# Patient Record
Sex: FEMALE | URBAN - METROPOLITAN AREA
[De-identification: names, ages, dates, MRNs, and addresses within clinical notes are randomized per-mention and may not be internally consistent; named-entity substitution may affect disease eponyms.]

---

## 2019-10-18 VITALS
RESPIRATION RATE: 18 BRPM | HEART RATE: 105 BPM | DIASTOLIC BLOOD PRESSURE: 64 MMHG | OXYGEN SATURATION: 99 % | TEMPERATURE: 98 F | SYSTOLIC BLOOD PRESSURE: 143 MMHG

## 2019-10-18 RX ORDER — CHLORHEXIDINE GLUCONATE 213 G/1000ML
1 SOLUTION TOPICAL ONCE
Refills: 0 | Status: DISCONTINUED | OUTPATIENT
Start: 2019-10-24 | End: 2019-10-24

## 2019-10-18 NOTE — H&P ADULT - NSHPSOCIALHISTORY_GEN_ALL_CORE
Current smoker: 7-8 cig/day: since age 13  Denies ETOH or drug use  Has HHA   In addition, utilizes cane/walker and at times wheelchair at home.

## 2019-10-18 NOTE — H&P ADULT - MENTAL STATUS
Right sided hemiparesis: 5/5 LLE and LUE strength  Right side: RUE contracted; 3/5 strength upper extremity and 2/5 lower extremity.

## 2019-10-18 NOTE — H&P ADULT - NSICDXPASTMEDICALHX_GEN_ALL_CORE_FT
PAST MEDICAL HISTORY:  CAD (coronary artery disease)     DM (diabetes mellitus)     HLD (hyperlipidemia)     HTN (hypertension) PAST MEDICAL HISTORY:  CAD (coronary artery disease)     CVA (cerebrovascular accident) right sided hemiparesis    DM (diabetes mellitus)     HLD (hyperlipidemia)     HTN (hypertension)

## 2019-10-18 NOTE — H&P ADULT - HISTORY OF PRESENT ILLNESS
***SKELETON***  confirm where pt had cath, called [facility] and couldnt find pt, per Bonita note pt had residual dz      78yo Female with PMHx of HTN, HLD, CAD s/p PCI (??[facility] 8/2019??), DM, CVA (23yrs ago, ???residual deficits?), Carotid artery stenosis and hypothyroidism who presented to Dr. Mesa c/o L shoulder pain radiating to L arm x ____. She endorses associated MANUEL and palpitations. Pt denies __ CP, palpitations, dizziness, syncope, fatigue, SOB, MANUEL, orthopnea, PND, LE edema, N/V, fever or chills. Per MD note, pts most recent cath has residual disease, unable to obtain report.    In light of pts risk factors, CCS class __ anginal symptoms and known residual disease, pt presents for recommended cardiac catheterization with possible intervention if clinically indicated. 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 Mesa c/o L shoulder pain radiating to L arm x ____.   She endorses associated MANUEL and palpitations. Pt denies __ CP, palpitations, dizziness, syncope, fatigue, SOB, MANUEL, orthopnea, PND, LE edema, N/V, fever or chills. Per MD note, pts most recent cath has residual disease, unable to obtain report.    In light of pts risk factors, CCS class __ anginal symptoms and known residual disease, pt presents for recommended cardiac catheterization with possible intervention if clinically indicated. History obtained from patient, patient’s HHA Kandace and MD office note.     80yo Female, current smoker (7-8 cigg/day), has HHA, with a PMHx of HTN, HLD, known CAD s/p MI and PCI (per patient [facility] 8/2019-report pending), DM, CVA'01 with right sided hemiparesis, Carotid artery stenosis, and hypothyroidism who presented to Dr. Mesa c/o intermittent left sided non radiating chest discomfort at rest x several months. Per MD office note she also reported MANUEL and palpitations. Patient dizziness, syncope, fatigue, SOB, MANUEL, orthopnea, PND, LE edema, N/V, fever or chills. Per MD note, pts most recent cath has residual disease, unable to obtain report.  Patient is currently on ASA/Plavix maintenance therapy and remains compliant.     In light of pts risk factors, CCS class IV Anginal symptoms and known residual disease per MD office note, pt presents for recommended cardiac catheterization with possible intervention if clinically indicated. History obtained from patient, patient’s HHA Kandace and MD office note.     78yo Female, current smoker (7-8 cigg/day), has HHA, with a PMHx of HTN, HLD, known CAD s/p MI and PCI (per patient [facility] 8/2019-report pending), DM, CVA'01 with right sided hemiparesis, Carotid artery stenosis, and hypothyroidism who presented to Dr. Mesa c/o intermittent left sided non radiating chest discomfort at rest x several months. Per MD office note she also reported MANUEL and palpitations. Patient denies dizziness, syncope, fatigue, SOB, MANUEL, orthopnea, PND, LE edema, N/V, fever or chills. Per MD note, pts most recent cath has residual disease, unable to obtain report ([facility] states they do not have record of this patient).  Patient is currently on ASA/Plavix maintenance therapy and remains compliant.     In light of pts risk factors, CCS class IV Anginal symptoms and known residual disease per MD office note, pt presents for recommended cardiac catheterization with possible intervention if clinically indicated.

## 2019-10-18 NOTE — H&P ADULT - RS GEN PE MLT RESP DETAILS PC
clear to auscultation bilaterally/airway patent/breath sounds equal/normal/good air movement/respirations non-labored

## 2019-10-18 NOTE — H&P ADULT - ASSESSMENT
78yo Female, current smoker (7-8 cig/day), has HHA,  with a PMHx of HTN, HLD, known CAD s/p MI and PCI (per patient [facility] 8/2019-reprot pending), DM, CVA'01 with right sided hemiparesis, Carotid artery stenosis, and hypothyroidism presents for cardiac catheterization with possible intervention if clinically indicated due to patient's risk factors, known CAD and CCS Angina Class IV Symptoms.     -Pt remains compliant with ASA 81 mg daily and Plavix 75 mg daily. Took AM dose; will give additional  mg prior to procedure  -EF unknown; will initiate NS @ 50 cc/hour; appears euvolemic on exam  -Per patient she had three stents placed at [facility] 8/2019--called cath lab they do not have information on this patient.     Risks & benefits of procedure and alternative therapy have been explained to the patient including but not limited to: allergic reaction, bleeding w/possible need for blood transfusion, infection, renal and vascular compromise, limb damage, arrhythmia, stroke, vessel dissection/perforation, Myocardial infarction, emergent CABG. Informed consent obtained and in chart. 80yo Female, current smoker (7-8 cig/day), has HHA,  with a PMHx of HTN, HLD, known CAD s/p MI and PCI (per patient [facility] 8/2019-reprot pending), DM, CVA'01 with right sided hemiparesis, Carotid artery stenosis, and hypothyroidism presents for cardiac catheterization with possible intervention if clinically indicated due to patient's risk factors, known CAD and CCS Angina Class IV Symptoms.     -Pt remains compliant with ASA 81 mg daily and Plavix 75 mg daily. Took AM dose; will give additional  mg prior to procedure  -EF unknown; will initiate NS @ 50 cc/hour; appears euvolemic on exam  -Per patient she had three stents placed at [facility] 8/2019--called cath lab they do not have information on this patient.   Pt reports this is definitely where she had cardiac catheterization and even confirmed hospital address.   -Case reviewed with Interventional Cardiology Fellow Rama Castillo; she is aware we do not have cath lab report and aware of Hemolyzed potassium level with plan to redraw intra-procedure.        Risks & benefits of procedure and alternative therapy have been explained to the patient including but not limited to: allergic reaction, bleeding w/possible need for blood transfusion, infection, renal and vascular compromise, limb damage, arrhythmia, stroke, vessel dissection/perforation, Myocardial infarction, emergent CABG. Informed consent obtained and in chart.

## 2019-10-23 RX ORDER — LISINOPRIL 2.5 MG/1
1 TABLET ORAL
Qty: 0 | Refills: 0 | DISCHARGE

## 2019-10-23 RX ORDER — CILOSTAZOL 100 MG/1
1 TABLET ORAL
Qty: 0 | Refills: 0 | DISCHARGE

## 2019-10-23 RX ORDER — CLOPIDOGREL BISULFATE 75 MG/1
1 TABLET, FILM COATED ORAL
Qty: 0 | Refills: 0 | DISCHARGE

## 2019-10-23 RX ORDER — LEVOTHYROXINE SODIUM 125 MCG
1 TABLET ORAL
Qty: 0 | Refills: 0 | DISCHARGE

## 2019-10-23 RX ORDER — METFORMIN HYDROCHLORIDE 850 MG/1
1 TABLET ORAL
Qty: 0 | Refills: 0 | DISCHARGE

## 2019-10-24 ENCOUNTER — INPATIENT (INPATIENT)
Facility: HOSPITAL | Age: 79
LOS: 0 days | Discharge: HOME HEALTH | DRG: 287 | End: 2019-10-25
Attending: INTERNAL MEDICINE | Admitting: INTERNAL MEDICINE
Payer: MEDICARE

## 2019-10-24 LAB
ALBUMIN SERPL ELPH-MCNC: 4.2 G/DL — SIGNIFICANT CHANGE UP (ref 3.3–5)
ALP SERPL-CCNC: SIGNIFICANT CHANGE UP U/L (ref 40–120)
ALT FLD-CCNC: SIGNIFICANT CHANGE UP U/L (ref 10–45)
ANION GAP SERPL CALC-SCNC: 12 MMOL/L — SIGNIFICANT CHANGE UP (ref 5–17)
ANION GAP SERPL CALC-SCNC: 13 MMOL/L — SIGNIFICANT CHANGE UP (ref 5–17)
APTT BLD: 33.6 SEC — SIGNIFICANT CHANGE UP (ref 27.5–36.3)
AST SERPL-CCNC: SIGNIFICANT CHANGE UP U/L (ref 10–40)
BASOPHILS # BLD AUTO: 0.05 K/UL — SIGNIFICANT CHANGE UP (ref 0–0.2)
BASOPHILS NFR BLD AUTO: 0.9 % — SIGNIFICANT CHANGE UP (ref 0–2)
BILIRUB SERPL-MCNC: 0.4 MG/DL — SIGNIFICANT CHANGE UP (ref 0.2–1.2)
BUN SERPL-MCNC: 8 MG/DL — SIGNIFICANT CHANGE UP (ref 7–23)
BUN SERPL-MCNC: 8 MG/DL — SIGNIFICANT CHANGE UP (ref 7–23)
CALCIUM SERPL-MCNC: 9.3 MG/DL — SIGNIFICANT CHANGE UP (ref 8.4–10.5)
CALCIUM SERPL-MCNC: 9.8 MG/DL — SIGNIFICANT CHANGE UP (ref 8.4–10.5)
CHLORIDE SERPL-SCNC: 105 MMOL/L — SIGNIFICANT CHANGE UP (ref 96–108)
CHLORIDE SERPL-SCNC: 106 MMOL/L — SIGNIFICANT CHANGE UP (ref 96–108)
CHOLEST SERPL-MCNC: 185 MG/DL — SIGNIFICANT CHANGE UP (ref 10–199)
CK MB CFR SERPL CALC: 1.8 NG/ML — SIGNIFICANT CHANGE UP (ref 0–6.7)
CK SERPL-CCNC: 89 U/L — SIGNIFICANT CHANGE UP (ref 25–170)
CO2 SERPL-SCNC: 22 MMOL/L — SIGNIFICANT CHANGE UP (ref 22–31)
CO2 SERPL-SCNC: 24 MMOL/L — SIGNIFICANT CHANGE UP (ref 22–31)
CREAT SERPL-MCNC: 0.71 MG/DL — SIGNIFICANT CHANGE UP (ref 0.5–1.3)
CREAT SERPL-MCNC: 0.72 MG/DL — SIGNIFICANT CHANGE UP (ref 0.5–1.3)
EOSINOPHIL # BLD AUTO: 0.28 K/UL — SIGNIFICANT CHANGE UP (ref 0–0.5)
EOSINOPHIL NFR BLD AUTO: 5.3 % — SIGNIFICANT CHANGE UP (ref 0–6)
GLUCOSE BLDC GLUCOMTR-MCNC: 101 MG/DL — HIGH (ref 70–99)
GLUCOSE BLDC GLUCOMTR-MCNC: 96 MG/DL — SIGNIFICANT CHANGE UP (ref 70–99)
GLUCOSE SERPL-MCNC: 98 MG/DL — SIGNIFICANT CHANGE UP (ref 70–99)
GLUCOSE SERPL-MCNC: 99 MG/DL — SIGNIFICANT CHANGE UP (ref 70–99)
HBA1C BLD-MCNC: 5.7 % — HIGH (ref 4–5.6)
HCT VFR BLD CALC: 43.7 % — SIGNIFICANT CHANGE UP (ref 34.5–45)
HDLC SERPL-MCNC: 71 MG/DL — SIGNIFICANT CHANGE UP
HGB BLD-MCNC: 13.9 G/DL — SIGNIFICANT CHANGE UP (ref 11.5–15.5)
IMM GRANULOCYTES NFR BLD AUTO: 0.2 % — SIGNIFICANT CHANGE UP (ref 0–1.5)
INR BLD: 0.98 — SIGNIFICANT CHANGE UP (ref 0.88–1.16)
LIPID PNL WITH DIRECT LDL SERPL: 94 MG/DL — SIGNIFICANT CHANGE UP
LYMPHOCYTES # BLD AUTO: 2.45 K/UL — SIGNIFICANT CHANGE UP (ref 1–3.3)
LYMPHOCYTES # BLD AUTO: 46.5 % — HIGH (ref 13–44)
MCHC RBC-ENTMCNC: 30.5 PG — SIGNIFICANT CHANGE UP (ref 27–34)
MCHC RBC-ENTMCNC: 31.8 GM/DL — LOW (ref 32–36)
MCV RBC AUTO: 95.8 FL — SIGNIFICANT CHANGE UP (ref 80–100)
MONOCYTES # BLD AUTO: 0.61 K/UL — SIGNIFICANT CHANGE UP (ref 0–0.9)
MONOCYTES NFR BLD AUTO: 11.6 % — SIGNIFICANT CHANGE UP (ref 2–14)
NEUTROPHILS # BLD AUTO: 1.87 K/UL — SIGNIFICANT CHANGE UP (ref 1.8–7.4)
NEUTROPHILS NFR BLD AUTO: 35.5 % — LOW (ref 43–77)
NRBC # BLD: 0 /100 WBCS — SIGNIFICANT CHANGE UP (ref 0–0)
PLATELET # BLD AUTO: 298 K/UL — SIGNIFICANT CHANGE UP (ref 150–400)
POTASSIUM SERPL-MCNC: 3.7 MMOL/L — SIGNIFICANT CHANGE UP (ref 3.5–5.3)
POTASSIUM SERPL-MCNC: SIGNIFICANT CHANGE UP MMOL/L (ref 3.5–5.3)
POTASSIUM SERPL-SCNC: 3.7 MMOL/L — SIGNIFICANT CHANGE UP (ref 3.5–5.3)
POTASSIUM SERPL-SCNC: SIGNIFICANT CHANGE UP MMOL/L (ref 3.5–5.3)
PROT SERPL-MCNC: 8 G/DL — SIGNIFICANT CHANGE UP (ref 6–8.3)
PROTHROM AB SERPL-ACNC: 11.1 SEC — SIGNIFICANT CHANGE UP (ref 10–12.9)
RBC # BLD: 4.56 M/UL — SIGNIFICANT CHANGE UP (ref 3.8–5.2)
RBC # FLD: 13.8 % — SIGNIFICANT CHANGE UP (ref 10.3–14.5)
SODIUM SERPL-SCNC: 141 MMOL/L — SIGNIFICANT CHANGE UP (ref 135–145)
SODIUM SERPL-SCNC: 141 MMOL/L — SIGNIFICANT CHANGE UP (ref 135–145)
TOTAL CHOLESTEROL/HDL RATIO MEASUREMENT: 2.6 RATIO — LOW (ref 3.3–7.1)
TRIGL SERPL-MCNC: 99 MG/DL — SIGNIFICANT CHANGE UP (ref 10–149)
WBC # BLD: 5.27 K/UL — SIGNIFICANT CHANGE UP (ref 3.8–10.5)
WBC # FLD AUTO: 5.27 K/UL — SIGNIFICANT CHANGE UP (ref 3.8–10.5)

## 2019-10-24 PROCEDURE — 93010 ELECTROCARDIOGRAM REPORT: CPT

## 2019-10-24 PROCEDURE — 93458 L HRT ARTERY/VENTRICLE ANGIO: CPT | Mod: 26

## 2019-10-24 PROCEDURE — ZZZZZ: CPT

## 2019-10-24 RX ORDER — GLUCAGON INJECTION, SOLUTION 0.5 MG/.1ML
1 INJECTION, SOLUTION SUBCUTANEOUS ONCE
Refills: 0 | Status: DISCONTINUED | OUTPATIENT
Start: 2019-10-24 | End: 2019-10-24

## 2019-10-24 RX ORDER — DEXTROSE 50 % IN WATER 50 %
25 SYRINGE (ML) INTRAVENOUS ONCE
Refills: 0 | Status: DISCONTINUED | OUTPATIENT
Start: 2019-10-24 | End: 2019-10-24

## 2019-10-24 RX ORDER — LISINOPRIL 2.5 MG/1
40 TABLET ORAL DAILY
Refills: 0 | Status: DISCONTINUED | OUTPATIENT
Start: 2019-10-25 | End: 2019-10-24

## 2019-10-24 RX ORDER — DEXTROSE 50 % IN WATER 50 %
15 SYRINGE (ML) INTRAVENOUS ONCE
Refills: 0 | Status: DISCONTINUED | OUTPATIENT
Start: 2019-10-24 | End: 2019-10-24

## 2019-10-24 RX ORDER — INSULIN LISPRO 100/ML
VIAL (ML) SUBCUTANEOUS
Refills: 0 | Status: DISCONTINUED | OUTPATIENT
Start: 2019-10-24 | End: 2019-10-24

## 2019-10-24 RX ORDER — INSULIN LISPRO 100/ML
VIAL (ML) SUBCUTANEOUS ONCE
Refills: 0 | Status: DISCONTINUED | OUTPATIENT
Start: 2019-10-24 | End: 2019-10-24

## 2019-10-24 RX ORDER — DEXTROSE 50 % IN WATER 50 %
12.5 SYRINGE (ML) INTRAVENOUS ONCE
Refills: 0 | Status: DISCONTINUED | OUTPATIENT
Start: 2019-10-24 | End: 2019-10-24

## 2019-10-24 RX ORDER — AMLODIPINE BESYLATE 2.5 MG/1
10 TABLET ORAL DAILY
Refills: 0 | Status: DISCONTINUED | OUTPATIENT
Start: 2019-10-24 | End: 2019-10-24

## 2019-10-24 RX ORDER — CLOPIDOGREL BISULFATE 75 MG/1
75 TABLET, FILM COATED ORAL DAILY
Refills: 0 | Status: DISCONTINUED | OUTPATIENT
Start: 2019-10-25 | End: 2019-10-24

## 2019-10-24 RX ORDER — INFLUENZA VIRUS VACCINE 15; 15; 15; 15 UG/.5ML; UG/.5ML; UG/.5ML; UG/.5ML
0.5 SUSPENSION INTRAMUSCULAR ONCE
Refills: 0 | Status: DISCONTINUED | OUTPATIENT
Start: 2019-10-24 | End: 2019-10-24

## 2019-10-24 RX ORDER — SODIUM CHLORIDE 9 MG/ML
1000 INJECTION, SOLUTION INTRAVENOUS
Refills: 0 | Status: DISCONTINUED | OUTPATIENT
Start: 2019-10-24 | End: 2019-10-24

## 2019-10-24 RX ORDER — ATORVASTATIN CALCIUM 80 MG/1
10 TABLET, FILM COATED ORAL DAILY
Refills: 0 | Status: DISCONTINUED | OUTPATIENT
Start: 2019-10-24 | End: 2019-10-24

## 2019-10-24 RX ORDER — ASPIRIN/CALCIUM CARB/MAGNESIUM 324 MG
81 TABLET ORAL DAILY
Refills: 0 | Status: DISCONTINUED | OUTPATIENT
Start: 2019-10-25 | End: 2019-10-24

## 2019-10-24 RX ORDER — CILOSTAZOL 100 MG/1
100 TABLET ORAL
Refills: 0 | Status: DISCONTINUED | OUTPATIENT
Start: 2019-10-24 | End: 2019-10-24

## 2019-10-24 RX ORDER — LEVOTHYROXINE SODIUM 125 MCG
25 TABLET ORAL DAILY
Refills: 0 | Status: DISCONTINUED | OUTPATIENT
Start: 2019-10-24 | End: 2019-10-24

## 2019-10-24 RX ORDER — SODIUM CHLORIDE 9 MG/ML
500 INJECTION INTRAMUSCULAR; INTRAVENOUS; SUBCUTANEOUS
Refills: 0 | Status: DISCONTINUED | OUTPATIENT
Start: 2019-10-24 | End: 2019-10-24

## 2019-10-24 RX ORDER — ASPIRIN/CALCIUM CARB/MAGNESIUM 324 MG
243 TABLET ORAL ONCE
Refills: 0 | Status: COMPLETED | OUTPATIENT
Start: 2019-10-24 | End: 2019-10-24

## 2019-10-24 RX ADMIN — Medication 243 MILLIGRAM(S): at 14:51

## 2019-10-24 RX ADMIN — CILOSTAZOL 100 MILLIGRAM(S): 100 TABLET ORAL at 23:34

## 2019-10-24 RX ADMIN — SODIUM CHLORIDE 50 MILLILITER(S): 9 INJECTION INTRAMUSCULAR; INTRAVENOUS; SUBCUTANEOUS at 14:51

## 2019-10-24 RX ADMIN — SODIUM CHLORIDE 50 MILLILITER(S): 9 INJECTION INTRAMUSCULAR; INTRAVENOUS; SUBCUTANEOUS at 21:15

## 2019-10-24 NOTE — PROGRESS NOTE ADULT - SUBJECTIVE AND OBJECTIVE BOX
Interventional Cardiology PA SDA Discharge Note    Patient without complaints. Ambulated and voided without difficulties    Afebrile, VSS    Ext: Right Groin: no hematoma, no bruit, dressing; C/D/I    Pulses: intact RAD/DP/PT to baseline     A/P:    80yo Female, current smoker (7-8 cigg/day), has HHA, with a PMHx of HTN, HLD, known CAD s/p MI and PCI (per patient [facility] 8/2019-report pending), DM, CVA'01 with right sided hemiparesis, Carotid artery stenosis, and hypothyroidism who presented to Dr. Mesa c/o intermittent left sided non radiating chest discomfort at rest x several months. Per MD office note she also reported MANUEL and palpitations. Patient denies dizziness, syncope, fatigue, SOB, MANUEL, orthopnea, PND, LE edema, N/V, fever or chills. Per MD note, pts most recent cath has residual disease, unable to obtain report ([facility] states they do not have record of this patient).  Patient is currently on ASA/Plavix maintenance therapy and remains compliant. In light of pts risk factors, CCS class IV Anginal symptoms and known residual disease per MD office note, pt presents for recommended cardiac catheterization with possible intervention if clinically indicated.  pt s/p diagnostic cardiac cath revealing ___________.        1.	Stable for discharge as per attending Dr. Sandoval after bed rest, pt voids, groin stable and 30 minutes of ambulation.  2.	Follow-up with PMD/Cardiologist Bonita in 1-2 weeks  3.	Discharged forms signed and copies in chart Interventional Cardiology PA SDA Discharge Note    Patient without complaints. Ambulated and voided without difficulties    Afebrile, VSS    Ext: Right Groin: no hematoma, no bruit, dressing; C/D/I    Pulses: intact RAD/DP/PT to baseline     A/P:    78yo Female, current smoker (7-8 cigg/day), has HHA, with a PMHx of HTN, HLD, known CAD s/p MI and PCI (per patient [facility] 8/2019-report pending), DM, CVA'01 with right sided hemiparesis, Carotid artery stenosis, and hypothyroidism who presented to Dr. Mesa c/o intermittent left sided non radiating chest discomfort at rest x several months. Per MD office note she also reported MANUEL and palpitations. Patient denies dizziness, syncope, fatigue, SOB, MANUEL, orthopnea, PND, LE edema, N/V, fever or chills. Per MD note, pts most recent cath has residual disease, unable to obtain report ([facility] states they do not have record of this patient).  Patient is currently on ASA/Plavix maintenance therapy and remains compliant. In light of pts risk factors, CCS class IV Anginal symptoms and known residual disease per MD office note, pt presents for recommended cardiac catheterization with possible intervention if clinically indicated.  pt s/p diagnostic cardiac cath revealing pRCA 30%, LM normal, pLCx 30%, mLAD luminal.    1.	Stable for discharge as per attending Dr. Sandoval after bed rest, pt voids, groin stable and 30 minutes of ambulation.  2.	Follow-up with PMD/Cardiologist Bonita in 1-2 weeks  3.	Discharged forms signed and copies in chart

## 2019-10-25 ENCOUNTER — TRANSCRIPTION ENCOUNTER (OUTPATIENT)
Age: 79
End: 2019-10-25

## 2019-10-25 ENCOUNTER — INBOUND DOCUMENT (OUTPATIENT)
Age: 79
End: 2019-10-25

## 2019-10-25 VITALS
OXYGEN SATURATION: 97 % | HEART RATE: 89 BPM | DIASTOLIC BLOOD PRESSURE: 75 MMHG | SYSTOLIC BLOOD PRESSURE: 125 MMHG | RESPIRATION RATE: 17 BRPM

## 2019-10-25 LAB
ANION GAP SERPL CALC-SCNC: 12 MMOL/L — SIGNIFICANT CHANGE UP (ref 5–17)
BUN SERPL-MCNC: 6 MG/DL — LOW (ref 7–23)
CALCIUM SERPL-MCNC: 8.6 MG/DL — SIGNIFICANT CHANGE UP (ref 8.4–10.5)
CHLORIDE SERPL-SCNC: 109 MMOL/L — HIGH (ref 96–108)
CO2 SERPL-SCNC: 22 MMOL/L — SIGNIFICANT CHANGE UP (ref 22–31)
CREAT SERPL-MCNC: 0.7 MG/DL — SIGNIFICANT CHANGE UP (ref 0.5–1.3)
GLUCOSE SERPL-MCNC: 99 MG/DL — SIGNIFICANT CHANGE UP (ref 70–99)
HCT VFR BLD CALC: 36.8 % — SIGNIFICANT CHANGE UP (ref 34.5–45)
HGB BLD-MCNC: 11.8 G/DL — SIGNIFICANT CHANGE UP (ref 11.5–15.5)
MAGNESIUM SERPL-MCNC: 1.9 MG/DL — SIGNIFICANT CHANGE UP (ref 1.6–2.6)
MCHC RBC-ENTMCNC: 31 PG — SIGNIFICANT CHANGE UP (ref 27–34)
MCHC RBC-ENTMCNC: 32.1 GM/DL — SIGNIFICANT CHANGE UP (ref 32–36)
MCV RBC AUTO: 96.6 FL — SIGNIFICANT CHANGE UP (ref 80–100)
NRBC # BLD: 0 /100 WBCS — SIGNIFICANT CHANGE UP (ref 0–0)
PLATELET # BLD AUTO: 246 K/UL — SIGNIFICANT CHANGE UP (ref 150–400)
POTASSIUM SERPL-MCNC: 3.5 MMOL/L — SIGNIFICANT CHANGE UP (ref 3.5–5.3)
POTASSIUM SERPL-SCNC: 3.5 MMOL/L — SIGNIFICANT CHANGE UP (ref 3.5–5.3)
RBC # BLD: 3.81 M/UL — SIGNIFICANT CHANGE UP (ref 3.8–5.2)
RBC # FLD: 13.6 % — SIGNIFICANT CHANGE UP (ref 10.3–14.5)
SODIUM SERPL-SCNC: 143 MMOL/L — SIGNIFICANT CHANGE UP (ref 135–145)
WBC # BLD: 4.42 K/UL — SIGNIFICANT CHANGE UP (ref 3.8–10.5)
WBC # FLD AUTO: 4.42 K/UL — SIGNIFICANT CHANGE UP (ref 3.8–10.5)

## 2019-10-25 RX ORDER — ASPIRIN/CALCIUM CARB/MAGNESIUM 324 MG
1 TABLET ORAL
Qty: 0 | Refills: 0 | DISCHARGE
Start: 2019-10-25

## 2019-10-25 RX ORDER — ASPIRIN/CALCIUM CARB/MAGNESIUM 324 MG
1 TABLET ORAL
Qty: 0 | Refills: 0 | DISCHARGE

## 2019-10-25 RX ORDER — POTASSIUM CHLORIDE 20 MEQ
40 PACKET (EA) ORAL ONCE
Refills: 0 | Status: COMPLETED | OUTPATIENT
Start: 2019-10-25 | End: 2019-10-25

## 2019-10-25 RX ORDER — MAGNESIUM OXIDE 400 MG ORAL TABLET 241.3 MG
800 TABLET ORAL ONCE
Refills: 0 | Status: COMPLETED | OUTPATIENT
Start: 2019-10-25 | End: 2019-10-25

## 2019-10-25 RX ADMIN — CILOSTAZOL 100 MILLIGRAM(S): 100 TABLET ORAL at 05:50

## 2019-10-25 RX ADMIN — MAGNESIUM OXIDE 400 MG ORAL TABLET 800 MILLIGRAM(S): 241.3 TABLET ORAL at 10:09

## 2019-10-25 RX ADMIN — AMLODIPINE BESYLATE 10 MILLIGRAM(S): 2.5 TABLET ORAL at 05:50

## 2019-10-25 RX ADMIN — Medication 40 MILLIEQUIVALENT(S): at 10:10

## 2019-10-25 RX ADMIN — Medication 25 MICROGRAM(S): at 05:50

## 2019-10-25 NOTE — DISCHARGE NOTE PROVIDER - HOSPITAL COURSE
80yo Female, current smoker (7-8 cigg/day), has HHA, with a PMHx of HTN, HLD, known CAD s/p MI and PCI (per patient [facility] 8/2019-report pending), DM, CVA'01 with right sided hemiparesis, Carotid artery stenosis, and hypothyroidism who presented to Dr. Mesa c/o intermittent left sided non radiating chest discomfort at rest x several months. Per MD office note she also reported MANUEL and palpitations. Patient denies dizziness, syncope, fatigue, SOB, MANUEL, orthopnea, PND, LE edema, N/V, fever or chills. Per MD note, pts most recent cath has residual disease, unable to obtain report ([facility] states they do not have record of this patient).  Patient is currently on ASA/Plavix maintenance therapy and remains compliant. In light of pts risk factors, CCS class IV Anginal symptoms and known residual disease per MD office note, pt presents for recommended cardiac catheterization with possible intervention if clinically indicated. Pt now s/p diagnostic cath on 10/24/19: LM normal, pRCA 30%, pLCx 30%, mLAD luminal irregularities.  Access R groin manual pull at bedside. EF unknown. Pt admited 2/2 no ride home, transportation facility closes at 5pm. 78 yo Female, current smoker (7-8 cigg/day), has HHA, with a PMHx of HTN, HLD, known CAD s/p MI and PCI (per patient [facility] 8/2019-report pending), DM, CVA'01 with right sided hemiparesis, Carotid artery stenosis, and hypothyroidism who presented to Dr. Mesa c/o intermittent left sided non radiating chest discomfort at rest x several months. Per MD office note she also reported MANUEL and palpitations. Patient denies dizziness, syncope, fatigue, SOB, AMNUEL, orthopnea, PND, LE edema, N/V, fever or chills. Per MD note, pts most recent cath has residual disease, unable to obtain report ([facility] states they do not have record of this patient).  Patient is currently on ASA/Plavix maintenance therapy and remains compliant. In light of pts risk factors, CCS class IV Anginal symptoms and known residual disease per MD office note, pt presents for recommended cardiac catheterization with possible intervention if clinically indicated. Pt now s/p diagnostic cath on 10/24/19: LM normal, pRCA 30%, pLCx 30%, mLAD luminal irregularities.  Access R groin manual pull at bedside. EF unknown. Pt admitted overnight for safety and monitoring as she was a late case with no transportation home. Labs and telemetry reviewed. Hypokalemia K+3.5 K dur 40 mEq PO x 1 given. Magnesium 1.9-Mag Ox 800 mg PO x 1 given. Patient C/P free and HD stable and cleared for discharge by Dr. Siegel.          BP: 120’s/50s -70s		HR: 90s	RR: 16	    Temp: 98.5 F    Monitor :  NSR           O2 sat- 98% RA         GENERAL:  Sitting in bed. NAD     CVS: +S1 S2. RRR. No murmurs, rubs or gallops.    Pulm: CTA Bilaterally. No rhonchi, wheezes, rales.    Abd: BS x 4. Soft NT/ND    Ext: No clubbing, cyanosis, edema or calf tenderness BLE     Right Groin: Soft. Non-tender. No hematoma, bleed or bruit.     Distal Pulses Intact to Baseline 78 yo Female, current smoker (7-8 cigg/day), has HHA, with a PMHx of HTN, HLD, known CAD s/p MI and PCI (per patient [facility] 8/2019-report pending), DM, CVA'01 with right sided hemiparesis, Carotid artery stenosis, and hypothyroidism who presented to Dr. Mesa c/o intermittent left sided non radiating chest discomfort at rest x several months. Per MD office note she also reported MANUEL and palpitations. Patient denies dizziness, syncope, fatigue, SOB, MANUEL, orthopnea, PND, LE edema, N/V, fever or chills. Per MD note, pts most recent cath has residual disease, unable to obtain report ([facility] states they do not have record of this patient).  Patient is currently on ASA/Plavix maintenance therapy and remains compliant. In light of pts risk factors, CCS class IV Anginal symptoms and known residual disease per MD office note, pt presents for recommended cardiac catheterization with possible intervention if clinically indicated. Pt now s/p diagnostic cath on 10/24/19: LM normal, pRCA 30%, pLCx 30%, mLAD luminal irregularities.  Access R groin manual pull at bedside. EF unknown. Pt admitted overnight for safety and monitoring as she was a late case with no transportation home. Labs and telemetry reviewed. Hypokalemia K+3.5 K dur 40 mEq PO x 1 given. Magnesium 1.9-Mag Ox 800 mg PO x 1 given. Patient C/P free and HD stable and cleared for discharge by Dr. Siegle.          BP: 120’s/50s -70s		HR: 90s	RR: 16	    Temp: 98.5 F    Monitor :  NSR           O2 sat- 98% RA         GENERAL:  Sitting in bed. NAD     CVS: +S1 S2. RRR. No murmurs, rubs or gallops.    Pulm: CTA Bilaterally. No rhonchi, wheezes, rales.    Abd: BS x 4. Soft NT/ND    Ext: No clubbing, cyanosis, edema or calf tenderness BLE     Right Groin: Soft. Non-tender. No hematoma, bleed. +Bilateral bruits    Distal Pulses Intact to Baseline 80 yo Female, current smoker (7-8 cigg/day), has HHA, with a PMHx of HTN, HLD, known CAD s/p MI and PCI (per patient [facility] 8/2019-report pending), DM, CVA'01 with right sided hemiparesis, Carotid artery stenosis, and hypothyroidism who presented to Dr. Mesa c/o intermittent left sided non radiating chest discomfort at rest x several months. Per MD office note she also reported MANUEL and palpitations. Patient denies dizziness, syncope, fatigue, SOB, MANUEL, orthopnea, PND, LE edema, N/V, fever or chills. Per MD note, pts most recent cath has residual disease, unable to obtain report ([facility] states they do not have record of this patient).  Patient is currently on ASA/Plavix maintenance therapy and remains compliant. In light of pts risk factors, CCS class IV Anginal symptoms and known residual disease per MD office note, pt presents for recommended cardiac catheterization with possible intervention if clinically indicated. Pt now s/p diagnostic cath on 10/24/19: LM normal, pRCA 30%, pLCx 30%, mLAD luminal irregularities.  Access R groin manual pull at bedside. EF unknown. Pt admitted overnight for safety and monitoring as she was a late case with no transportation home. Labs and telemetry reviewed. Hypokalemia K+3.5 K dur 40 mEq PO x 1 given. Magnesium 1.9-Mag Ox 800 mg PO x 1 given. Patient C/P free and HD stable and cleared for discharge by Dr. Mesa        BP: 120’s/50s -70s		HR: 90s	RR: 16	    Temp: 98.5 F    Monitor :  NSR           O2 sat- 98% RA         GENERAL:  Sitting in bed. NAD     CVS: +S1 S2. RRR. No murmurs, rubs or gallops.    Pulm: CTA Bilaterally. No rhonchi, wheezes, rales.    Abd: BS x 4. Soft NT/ND    Ext: No clubbing, cyanosis, edema or calf tenderness BLE     Right Groin: Soft. Non-tender. No hematoma, bleed. +Bilateral bruits    Distal Pulses Intact to Baseline

## 2019-10-25 NOTE — DISCHARGE NOTE PROVIDER - NSDCCPCAREPLAN_GEN_ALL_CORE_FT
PRINCIPAL DISCHARGE DIAGNOSIS  Diagnosis: Coronary artery disease  Assessment and Plan of Treatment: You have mild blockages in your heart arteries. You had a CARDIAC CATHETERIZATION but did not receive any stents. RIGHT GROIN WOUND CARE: do not lift objects heavier than 5 pounds for 5 days. Observe for signs of bleeding including bleeding/sudden swelling to your right groin site, new/worsening back pain, or numbness/tingling/pain/coolness to your right leg/foot/toes. If you experience these symptoms call your doctor and go to the nearest ED immediately.      SECONDARY DISCHARGE DIAGNOSES  Diagnosis: Diabetes  Assessment and Plan of Treatment:     Diagnosis: Hyperlipidemia  Assessment and Plan of Treatment:     Diagnosis: Hypertension  Assessment and Plan of Treatment: PRINCIPAL DISCHARGE DIAGNOSIS  Diagnosis: Coronary artery disease  Assessment and Plan of Treatment: You have mild blockages in your heart arteries. You had a CARDIAC CATHETERIZATION but did not receive any stents. Continue Aspirin and Plavix. Follow-up with Dr. Mesa in 1- 2 weeks.   RIGHT GROIN WOUND CARE: do not lift objects heavier than 5 pounds for 5 days. Observe for signs of bleeding including bleeding/sudden swelling to your right groin site, new/worsening back pain, or numbness/tingling/pain/coolness to your right leg/foot/toes. If you experience these symptoms call your doctor and go to the nearest ED immediately.      SECONDARY DISCHARGE DIAGNOSES  Diagnosis: Diabetes mellitus, type II  Assessment and Plan of Treatment: Monitor Fingersticks before meals and at bedtime. DO NOT TAKE METFORMIN UNTIL BENY 10/27/19. Be strict with your diet the next two days as you will not be taking your Metformin.    Diagnosis: Hyperlipidemia  Assessment and Plan of Treatment: Maintain Low Cholesterol Diet. Continue Atorvastatin    Diagnosis: Hypertension  Assessment and Plan of Treatment: Monitor BP. Maintain Low Salt Diet. Continue Amlodipione PRINCIPAL DISCHARGE DIAGNOSIS  Diagnosis: Coronary artery disease  Assessment and Plan of Treatment: You have mild blockages in your heart arteries. You had a CARDIAC CATHETERIZATION but did not receive any stents. Continue Aspirin and Plavix. Follow-up with Dr. Mesa in 1- 2 weeks. STOP SMOKING. IT IS ONE OF THE BEST THINGS YOU CAN DO FOR YOUR HEALTH.   RIGHT GROIN WOUND CARE: do not lift objects heavier than 5 pounds for 5 days. You may shower once the dressing is removed, but avoid baths, hot tubs, or swimming for 5 days to prevent infection.  Observe for signs of bleeding including bleeding/sudden swelling to your right groin site, new/worsening back pain, weakness or paralysis of right leg (unable to lift or move) numbness/tingling/pain/coolness to your right leg/foot/toes. If you experience these symptoms call your doctor and go to the nearest ED immediately.      SECONDARY DISCHARGE DIAGNOSES  Diagnosis: Diabetes mellitus, type II  Assessment and Plan of Treatment: Monitor Fingersticks before meals and at bedtime. DO NOT TAKE METFORMIN UNTIL BENY 10/27/19. Be strict with your diet the next two days as you will not be taking your Metformin. Your Hemoglobin A1C is 5.7% which means your diabetes is well controlled.    Diagnosis: Hyperlipidemia  Assessment and Plan of Treatment: Maintain Low Cholesterol Diet. Continue Atorvastatin    Diagnosis: Hypertension  Assessment and Plan of Treatment: Monitor BP. Maintain Low Salt Diet. Continue Amlodipione

## 2019-10-25 NOTE — DISCHARGE NOTE NURSING/CASE MANAGEMENT/SOCIAL WORK - NSDCPEPTSTRK_GEN_ALL_CORE
Risk factors for stroke/Stroke support groups for patients, families, and friends/Signs and symptoms of stroke/Call 911 for stroke/Need for follow up after discharge/Prescribed medications/Stroke education booklet/Stroke warning signs and symptoms

## 2019-10-25 NOTE — DISCHARGE NOTE PROVIDER - CARE PROVIDER_API CALL
Neal Mesa)  Cardiovascular Disease; Internal Medicine  [address] [id number]  Phone: [phone]  Fax: [phone]  Follow Up Time:

## 2019-10-25 NOTE — DISCHARGE NOTE NURSING/CASE MANAGEMENT/SOCIAL WORK - PATIENT PORTAL LINK FT
You can access the FollowMyHealth Patient Portal offered by [facility] by registering at the following website: [email or web address]. By joining [facility] [facility]’s FollowMyHealth portal, you will also be able to view your health information using other applications (apps) compatible with our system.

## 2019-11-01 ENCOUNTER — OUTPATIENT (OUTPATIENT)
Dept: OUTPATIENT SERVICES | Facility: HOSPITAL | Age: 79
LOS: 1 days | End: 2019-11-01
Payer: MEDICARE

## 2019-11-01 DIAGNOSIS — E11.9 TYPE 2 DIABETES MELLITUS WITHOUT COMPLICATIONS: ICD-10-CM

## 2019-11-01 DIAGNOSIS — R07.9 CHEST PAIN, UNSPECIFIED: ICD-10-CM

## 2019-11-01 DIAGNOSIS — Z98.61 CORONARY ANGIOPLASTY STATUS: ICD-10-CM

## 2019-11-01 DIAGNOSIS — I10 ESSENTIAL (PRIMARY) HYPERTENSION: ICD-10-CM

## 2019-11-01 DIAGNOSIS — I25.110 ATHEROSCLEROTIC HEART DISEASE OF NATIVE CORONARY ARTERY WITH UNSTABLE ANGINA PECTORIS: ICD-10-CM

## 2019-11-01 DIAGNOSIS — I25.2 OLD MYOCARDIAL INFARCTION: ICD-10-CM

## 2019-11-01 DIAGNOSIS — E78.5 HYPERLIPIDEMIA, UNSPECIFIED: ICD-10-CM

## 2019-11-01 DIAGNOSIS — E03.9 HYPOTHYROIDISM, UNSPECIFIED: ICD-10-CM

## 2019-11-01 DIAGNOSIS — I69.951 HEMIPLEGIA AND HEMIPARESIS FOLLOWING UNSPECIFIED CEREBROVASCULAR DISEASE AFFECTING RIGHT DOMINANT SIDE: ICD-10-CM

## 2019-11-01 DIAGNOSIS — I65.29 OCCLUSION AND STENOSIS OF UNSPECIFIED CAROTID ARTERY: ICD-10-CM

## 2019-11-01 PROCEDURE — G9001: CPT

## 2019-11-12 PROCEDURE — 80053 COMPREHEN METABOLIC PANEL: CPT

## 2019-11-12 PROCEDURE — C1894: CPT

## 2019-11-12 PROCEDURE — 82553 CREATINE MB FRACTION: CPT

## 2019-11-12 PROCEDURE — 80048 BASIC METABOLIC PNL TOTAL CA: CPT

## 2019-11-12 PROCEDURE — 83735 ASSAY OF MAGNESIUM: CPT

## 2019-11-12 PROCEDURE — 85025 COMPLETE CBC W/AUTO DIFF WBC: CPT

## 2019-11-12 PROCEDURE — 82550 ASSAY OF CK (CPK): CPT

## 2019-11-12 PROCEDURE — 93005 ELECTROCARDIOGRAM TRACING: CPT

## 2019-11-12 PROCEDURE — 85027 COMPLETE CBC AUTOMATED: CPT

## 2019-11-12 PROCEDURE — 36415 COLL VENOUS BLD VENIPUNCTURE: CPT

## 2019-11-12 PROCEDURE — 85730 THROMBOPLASTIN TIME PARTIAL: CPT

## 2019-11-12 PROCEDURE — 83036 HEMOGLOBIN GLYCOSYLATED A1C: CPT

## 2019-11-12 PROCEDURE — 85610 PROTHROMBIN TIME: CPT

## 2019-11-12 PROCEDURE — 80061 LIPID PANEL: CPT

## 2019-11-12 PROCEDURE — C1766: CPT

## 2019-11-12 PROCEDURE — C1769: CPT

## 2019-11-12 PROCEDURE — 82962 GLUCOSE BLOOD TEST: CPT

## 2019-11-12 PROCEDURE — C1887: CPT

## 2019-11-14 DIAGNOSIS — Z71.89 OTHER SPECIFIED COUNSELING: ICD-10-CM

## 2019-11-14 PROBLEM — E11.9 TYPE 2 DIABETES MELLITUS WITHOUT COMPLICATIONS: Chronic | Status: ACTIVE | Noted: 2019-10-18

## 2019-11-14 PROBLEM — I63.9 CEREBRAL INFARCTION, UNSPECIFIED: Chronic | Status: ACTIVE | Noted: 2019-10-23

## 2019-11-14 PROBLEM — I10 ESSENTIAL (PRIMARY) HYPERTENSION: Chronic | Status: ACTIVE | Noted: 2019-10-18

## 2019-11-14 PROBLEM — E78.5 HYPERLIPIDEMIA, UNSPECIFIED: Chronic | Status: ACTIVE | Noted: 2019-10-18

## 2019-11-14 PROBLEM — I25.10 ATHEROSCLEROTIC HEART DISEASE OF NATIVE CORONARY ARTERY WITHOUT ANGINA PECTORIS: Chronic | Status: ACTIVE | Noted: 2019-10-18

## 2023-09-14 NOTE — H&P ADULT - GASTROINTESTINAL DETAILS
PRE-OP DIAGNOSIS:  Primary osteoarthritis of right knee 14-Sep-2023 14:24:15  Radha Nichols   no masses palpable/soft/nontender/no distention/normal